# Patient Record
Sex: FEMALE | Race: WHITE | Employment: UNEMPLOYED | URBAN - METROPOLITAN AREA
[De-identification: names, ages, dates, MRNs, and addresses within clinical notes are randomized per-mention and may not be internally consistent; named-entity substitution may affect disease eponyms.]

---

## 2025-02-11 ENCOUNTER — HOSPITAL ENCOUNTER (OUTPATIENT)
Dept: PHYSICAL THERAPY | Age: 45
Setting detail: THERAPIES SERIES
Discharge: HOME | End: 2025-02-11
Payer: MEDICAID

## 2025-02-11 PROCEDURE — 97161 PT EVAL LOW COMPLEX 20 MIN: CPT

## 2025-02-11 PROCEDURE — 97110 THERAPEUTIC EXERCISES: CPT

## 2025-02-11 NOTE — PROGRESS NOTES
Physical Therapy: Initial Evaluation    Patient: Silvia Lewis (44 y.o. female)   Examination Date: 2025  Plan of Care Certification Period: 2025 to        :  1980 ;    Confirmed: Y MRN: [id number]  CSN: [id number]   Insurance: Pay[place]: [place] / Plan[place] / Product Type: *No Product type* /   Insurance ID: [id number] - (Medicaid Managed) Secondary Insurance (if applicable):    Referring Physician: Roxanne Musa PA-C Sara Lyons, PA-C   PCP: Yani Gr APRN - CNP Visits to Date/Visits Approved:   /      No Show/Cancelled Appts:   /       Medical Diagnosis: Spondylolisthesis, lumbar region [M43.16] Spondylolisthesis of lumbar region  Treatment Diagnosis: LBP, BLE weakness     PERTINENT MEDICAL HISTORY   Patient Assessed for Rehabilitation Services: Yes       Medical History: Chart Reviewed: Yes   Past Medical History:   Diagnosis Date    Arthritis     DJD (degenerative joint disease)     Gout     Hip pain     Migraine     Obesity      Surgical History:   Past Surgical History:   Procedure Laterality Date    CHOLECYSTECTOMY      EYE SURGERY      HYSTERECTOMY (CERVIX STATUS UNKNOWN)      TONSILLECTOMY      TUBAL LIGATION         Medications:   Current Outpatient Medications:     acetaminophen (AMINOFEN) 325 MG tablet, Take 2 tablets by mouth every 6 hours as needed for Pain, Disp: 30 tablet, Rfl: 1    ondansetron (ZOFRAN ODT) 4 MG disintegrating tablet, Take 1 tablet by mouth every 8 hours as needed for Nausea, Disp: 15 tablet, Rfl: 0    gabapentin (NEURONTIN) 300 MG capsule, Take 1 capsule by mouth 2 times daily, Disp: 60 capsule, Rfl: 3    meloxicam (MOBIC) 15 MG tablet, Take 1 tablet by mouth daily, Disp: 30 tablet, Rfl: 3  Allergies: Tramadol      SUBJECTIVE EXAMINATION     History obtained from:: Patient, Chart Review,           Subjective History:    Subjective: Pt had back surgery on 10/07/24, OLIF (oblique lateral

## 2025-02-11 NOTE — PLAN OF CARE
Outpatient Physical Therapy           [place]           [x] Phone: [phone]   Fax: [phone]  [place]           [] Phone: [phone]   Fax: [phone]     To:  Roxanne Musa PA-C   From: Neida Mendoza, PT, DPT     Patient: Silvia Lewis       : 1980  Diagnosis:  Diagnosis: Spondylolisthesis of lumbar region  Treatment Diagnosis: LBP, BLE weakness  Date: 2025    Physical Therapy Certification/Re-Certification Form  Dear Roxanne Musa,  The following patient has been evaluated for physical therapy services and for therapy to continue, insurance requires physician review of the treatment plan initially and every 90 days. Please review the attached evaluation and/or summary of the patient's plan of care, and verify that you agree therapy should continue by signing the attached document and sending it back to our office.    Assessment:    Assessment: Pt is a 44-year-old female who presents to therapy s/p L4-5 OLIF performed on 10/07/24. Upon assessment, pt does present with impaired lumbar ROM, LBP, LLE pain and sensation deficits, BLE weakness, impaired gait, impaired transfer and reduced independence with ADLs and IADLs. Pt to participate in aquatic therapy per the prescription and return to this clinic after ~8 visits for goal check. Pt would benefit from skilled therapy interventions to address listed impairments, progress toward goal completion, and improve ADL/IADL status. PT also warranted to reduce risk for further injury or decline.  Patient agrees with established plan of care and assisted in the development of their short term and long term goals. Patient had no adverse reaction with initial treatment and there are no barriers to learning.  Learning preferences include demonstration, practice, and handouts.  Patient expressed understanding of HEP and appears to be motivated to participate in an active PT program including compliance with HEP expectations.      Plan of

## 2025-02-20 ENCOUNTER — HOSPITAL ENCOUNTER (OUTPATIENT)
Dept: PHYSICAL THERAPY | Age: 45
Setting detail: THERAPIES SERIES
Discharge: HOME | End: 2025-02-20
Payer: MEDICAID

## 2025-02-20 PROCEDURE — 97113 AQUATIC THERAPY/EXERCISES: CPT

## 2025-02-20 NOTE — FLOWSHEET NOTE
Physical Therapy Aquatic Flow Sheet   Date:  2025    Patient Name:  Silvia Lewis    :  1980  Restrictions/Precautions:    Diagnosis:    Spondylolisthesis, lumbar region [M43.16] Spondylolisthesis of lumbar region   Treatment Diagnosis:   LBP, BLE weakness   Insurance/Certification information:  [place] / Plan[place]   Referring Physician:   Roxanne Musa PA-C   Any changes in Ambulatory Summary Sheet?:  Plan of care signed (Y/N):    Visit# / total visits:  /10  Pain level: /10   Electronically signed by:  Sophy Sterling PT, PT    Subjective:     Key  B= Belt DB= Dumbells T= Theratube   H= Hydrotone N= Noodles W= Weights   P= Paddles S= Speedo equipment K= Kickboard     Exercises/Activities   Warm-up/Amb    Exercises      Slow forward     HR/TR      Slow sideways    Marches      Slow backwards    Mini-squats      Medium forward    4-way SLR      Medium sideways    Hip circles/fig 8         Hamstring curls         Knee extension         Pelvic tilts         Scap squeezes          Shoulder flex/ext      Functional    Shoulder abd/add      Step    Shoulder H. abd/add      Lifting    Shoulder IR/ER      Hand to opp knee    Rowing      Push down squat    Bilateral pull down         Push/pull         Push downs         Arm circles      SLS            Tubing - fwd/bwd walk      Stretching   Tubing paloff      Gastroc/Soleus         Hamstring             Other                                                    Treatment Included:  [] Therapeutic Exercise   [] Instruction in HEP  [] Group   [] Therapeutic Activity      [] Neuromuscular Re-education [] Aquatic   [] Gait             [] Manual  Other:  Time In/ Time Out:     Timed Code Treatment Minutes:      Total Treatment Minutes:      Objective Findings:    Communication with other providers:    Education to Patient:    Adverse Reaction to Treatment:    Assessment:     Treatment/Activity Tolerance:  [] Patient tolerated treatment

## 2025-02-27 ENCOUNTER — HOSPITAL ENCOUNTER (OUTPATIENT)
Dept: PHYSICAL THERAPY | Age: 45
Setting detail: THERAPIES SERIES
Discharge: HOME | End: 2025-02-27
Payer: MEDICAID

## 2025-02-27 PROCEDURE — 97113 AQUATIC THERAPY/EXERCISES: CPT

## 2025-02-28 NOTE — FLOWSHEET NOTE
Physical Therapy Aquatic Flow Sheet   Date:  2025    Patient Name:  Silvia Lewis                    :  1980                       MRN: [id number]  Restrictions/Precautions: n/a  Diagnosis:    Diagnosis: Spondylolisthesis of lumbar region  Date of Injury/Surgery: 10/7/24  Treatment Diagnosis:  LBP, BLE weakness  [facility]  Referring Physician:   Roxanne Musa PA-C   Plan of care signed (Y/N):  sent   Outcome Measure: Oswestry:     Visit# / total visits:   3 / 10    Pain level:  7 /10   Electronically signed by:  Marsha Donaldson PTA,     Subjective: Patient states that she was just \"sore no pain\" for a few hours after last session; then pain returned. States back pain is higher today; currently 7/10; but ready for pool session.    Key  B= Belt DB= Dumbells T= Theratube   H= Hydrotone N= Noodles W= Weights   P= Paddles S= Speedo equipment K= Kickboard     Exercises/Activities       Date:  24 Visit:  2/10      Warm-up/Amb    Exercises       Forward/retro   X 3 lengths ea with education for posture corrections and core engagement.and cool down laps again at end of session  Hip flex, ext, abd/add (3-way all together); HR; marching   Standing with light UE assist , x 10 reps ea 3-ways; and x 15 reps march and HR; with education for posture and core corrections.    sideways   X 3 lengths ea with education for posture corrections and core engagement. Shoulder flex/ext, abd/add, horizontal abd/add and rows  Seated on bench, feet on floor,back unsupported off wall x 15 reps ea, no resistance; cues for posture and core.            Bicycling  Corner dangle position x 5 mins                Functional         Step  In/out of pool by steps with B HRA      Sit <>stand squats  Light touch of buttocks to pool bench x 10 reps      Modified TUG         Stretching         Gastroc/Soleus         Hamstring          SKTC        Corner dangle position Dangle x

## 2025-03-06 ENCOUNTER — HOSPITAL ENCOUNTER (OUTPATIENT)
Dept: PHYSICAL THERAPY | Age: 45
Setting detail: THERAPIES SERIES
Discharge: HOME | End: 2025-03-06
Payer: MEDICAID

## 2025-03-06 PROCEDURE — 97113 AQUATIC THERAPY/EXERCISES: CPT

## 2025-03-07 NOTE — FLOWSHEET NOTE
Physical Therapy Aquatic Flow Sheet   Date:  3-6-25    Patient Name:  Silvia Lewis                    :  1980                       MRN: [id number]  Restrictions/Precautions: n/a  Diagnosis:    Diagnosis: Spondylolisthesis of lumbar region  Date of Injury/Surgery: 10/7/24  Treatment Diagnosis:  LBP, BLE weakness  Insurance/Certification information: Human - 30 Y  Referring Physician:   Roxanne Musa PA-C   Plan of care signed (Y/N):  sent   Outcome Measure: Oswestry:     Visit# / total visits:   4 / 10    Pain level:  7 /10   Electronically signed by:  Danika Rodríguez PTA,     Subjective: Patient states     Key  B= Belt DB= Dumbells T= Theratube   H= Hydrotone N= Noodles W= Weights   P= Paddles S= Speedo equipment K= Kickboard     Exercises/Activities       Date:  3-7-25 Visit:  4/10      Warm-up/Amb    Exercises       Forward/retro   X 3 lengths ea with education for posture corrections and core engagement.and cool down laps again at end of session  Hip flex, ext, abd/add (3-way all together); HR; marching   Standing with light UE assist , x 10 reps ea 3-ways; and x 15 reps march and HR; with education for posture and core corrections.    sideways   X 3 lengths ea with education for posture corrections and core engagement. Shoulder flex/ext, abd/add, horizontal abd/add and rows  Seated on bench, feet on floor,back unsupported off wall x 15 reps ea, no resistance; cues for posture and core.            Bicycling  Corner dangle position x 5 mins                Functional         Step  In/out of pool by steps with B HRA      Sit <>stand squats  Light touch of buttocks to pool bench x 10 reps      Modified TUG         Stretching         Gastroc/Soleus         Hamstring          SKTC        Corner dangle position Dangle x 3-4 mins for distraction stretch; pain relief now reported 3/10        Treatment Included:  [] Therapeutic Exercise   [] Instruction in HEP  [] Group   [] Therapeutic Activity

## 2025-03-13 ENCOUNTER — HOSPITAL ENCOUNTER (OUTPATIENT)
Dept: PHYSICAL THERAPY | Age: 45
Setting detail: THERAPIES SERIES
Discharge: HOME | End: 2025-03-13
Payer: MEDICAID

## 2025-03-13 PROCEDURE — 97113 AQUATIC THERAPY/EXERCISES: CPT

## 2025-03-14 NOTE — FLOWSHEET NOTE
Physical Therapy Aquatic Flow Sheet   Date:  3-13-25    Patient Name:  Silvia Lewis                    :  1980                       MRN: [id number]  Restrictions/Precautions: n/a  Diagnosis:    Diagnosis: Spondylolisthesis of lumbar region  Date of Injury/Surgery: 10/7/24  Treatment Diagnosis:  LBP, BLE weakness  Insurance/Certification information: Human - [facility]  Referring Physician:   Roxanne Musa PA-C   Plan of care signed (Y/N):  sent   Outcome Measure: Oswestry:     Visit# / total visits:   5 / 10    Pain level:  5 /10 right now but sharp intermittent pain up to -9/10  Electronically signed by:  Danika Rodríguez, DAVY, [id number]     Subjective: Patient states pain has remained high this week, but they gave her a new med to help her sleep. Reports better sleeping last 3 nights.     Key  B= Belt DB= Dumbells T= Theratube   H= Hydrotone N= Noodles W= Weights   P= Paddles S= Speedo equipment K= Kickboard     Exercises/Activities       Date:  3-14-25 Visit:  5 / 10      Warm-up/Amb    Exercises       Forward/retro   X 3 lengths ea with education for posture corrections and core engagement.and cool down laps again at end of session  Hip flex, ext, abd/add, march    Completed individually due to reports of pain with 3 way and increased pain today. X 10 reps ea with education for posture and core corrections.    sideways   X 3 lengths ea with education for posture corrections and core engagement. Shoulder flex/ext, abd/add, horizontal abd/add and rows  Seated on bench, feet on floor,back unsupported off wall x 15 reps , requested no resistance due to pain in LB to stabilize the movement.   Cues for posture and core.             jets   Declined jets, sometimes pressure increases pain.             Functional         Step  In/out of pool by steps with B HRA      Sit <>stand squats  Light touch of buttocks to pool bench x 10 reps      Modified TUG         Stretching         Gastroc/Soleus

## 2025-03-20 ENCOUNTER — HOSPITAL ENCOUNTER (OUTPATIENT)
Dept: PHYSICAL THERAPY | Age: 45
Setting detail: THERAPIES SERIES
Discharge: HOME | End: 2025-03-20
Payer: MEDICAID

## 2025-03-20 PROCEDURE — 97113 AQUATIC THERAPY/EXERCISES: CPT

## 2025-03-20 NOTE — FLOWSHEET NOTE
Physical Therapy Aquatic Flow Sheet   Date:  3-13-25    Patient Name:  Silvia Lewis                    :  1980                       MRN: [id number]  Restrictions/Precautions: n/a  Diagnosis:    Diagnosis: Spondylolisthesis of lumbar region  Date of Injury/Surgery: 10/7/24  Treatment Diagnosis:  LBP, BLE weakness  Insurance/Certification information: Human - [facility]  Referring Physician:   Roxanne Musa PA-C   Plan of care signed (Y/N):  sent   Outcome Measure: Oswestry:     Visit# / total visits:   5 / 10    Pain level:  5 /10 right now but sharp intermittent pain up to -9/10  Electronically signed by:  Sophy Sterling, PT, [id number]     Subjective: Patient states pain has remained high this week, but they gave her a new med to help her sleep. Reports better sleeping last 3 nights.     Key  B= Belt DB= Dumbells T= Theratube   H= Hydrotone N= Noodles W= Weights   P= Paddles S= Speedo equipment K= Kickboard     Exercises/Activities       Date:  3-14-25 Visit:  5 / 10      Warm-up/Amb    Exercises       Forward/retro   X 3 lengths ea with education for posture corrections and core engagement.and cool down laps again at end of session  Hip flex, ext, abd/add, march    Completed individually due to reports of pain with 3 way and increased pain today. X 10 reps ea with education for posture and core corrections.    sideways   X 3 lengths ea with education for posture corrections and core engagement. Shoulder flex/ext, abd/add, horizontal abd/add and rows  Seated on bench, feet on floor,back unsupported off wall x 15 reps , requested no resistance due to pain in LB to stabilize the movement.   Cues for posture and core.             jets   Declined jets, sometimes pressure increases pain.             Functional         Step  In/out of pool by steps with B HRA      Sit <>stand squats  Light touch of buttocks to pool bench x 10 reps      Modified TUG         Stretching         Gastroc/Soleus

## 2025-03-27 ENCOUNTER — HOSPITAL ENCOUNTER (OUTPATIENT)
Dept: PHYSICAL THERAPY | Age: 45
Setting detail: THERAPIES SERIES
Discharge: HOME | End: 2025-03-27
Payer: MEDICAID

## 2025-03-27 PROCEDURE — 97113 AQUATIC THERAPY/EXERCISES: CPT

## 2025-04-03 ENCOUNTER — HOSPITAL ENCOUNTER (OUTPATIENT)
Dept: PHYSICAL THERAPY | Age: 45
Setting detail: THERAPIES SERIES
Discharge: HOME | End: 2025-04-03
Payer: MEDICAID

## 2025-04-03 PROCEDURE — 97113 AQUATIC THERAPY/EXERCISES: CPT

## 2025-04-17 ENCOUNTER — HOSPITAL ENCOUNTER (OUTPATIENT)
Dept: PHYSICAL THERAPY | Age: 45
Discharge: HOME | End: 2025-04-17

## 2025-04-17 NOTE — FLOWSHEET NOTE
Physical Therapy  Cancellation/No-show Note  Patient Name:  Silvia Lewis  :  1980   Date:  2025  Cancelled visits to date: 0  No-shows to date: 1    For today's appointment patient:  []  Cancelled  []  Rescheduled appointment  [x]  No-show     Reason given by patient:  []  Patient ill  []  Conflicting appointment  []  No transportation    []  Conflict with work  []  No reason given  []  Other:     Comments:  PT left VM of this missed apt and to call back to get on schedule for re-assessment of goals after pool visits.    Electronically signed by:  Neida Mendoza, PT, DPT